# Patient Record
Sex: FEMALE | HISPANIC OR LATINO | Employment: FULL TIME | URBAN - METROPOLITAN AREA
[De-identification: names, ages, dates, MRNs, and addresses within clinical notes are randomized per-mention and may not be internally consistent; named-entity substitution may affect disease eponyms.]

---

## 2021-04-01 ENCOUNTER — OFFICE VISIT (OUTPATIENT)
Dept: OBSTETRICS AND GYNECOLOGY | Facility: CLINIC | Age: 41
End: 2021-04-01

## 2021-04-01 ENCOUNTER — LAB (OUTPATIENT)
Dept: LAB | Facility: HOSPITAL | Age: 41
End: 2021-04-01

## 2021-04-01 VITALS
DIASTOLIC BLOOD PRESSURE: 78 MMHG | BODY MASS INDEX: 34.27 KG/M2 | HEIGHT: 59 IN | WEIGHT: 170 LBS | SYSTOLIC BLOOD PRESSURE: 120 MMHG | TEMPERATURE: 96.9 F

## 2021-04-01 DIAGNOSIS — N93.9 ABNORMAL UTERINE BLEEDING (AUB): ICD-10-CM

## 2021-04-01 DIAGNOSIS — N60.11 FIBROCYSTIC BREAST CHANGES OF BOTH BREASTS: ICD-10-CM

## 2021-04-01 DIAGNOSIS — R10.30 LOWER ABDOMINAL PAIN: ICD-10-CM

## 2021-04-01 DIAGNOSIS — Z12.39 BREAST SCREENING: ICD-10-CM

## 2021-04-01 DIAGNOSIS — N60.12 FIBROCYSTIC BREAST CHANGES OF BOTH BREASTS: ICD-10-CM

## 2021-04-01 DIAGNOSIS — Z01.419 WOMEN'S ANNUAL ROUTINE GYNECOLOGICAL EXAMINATION: ICD-10-CM

## 2021-04-01 DIAGNOSIS — N85.2 UTERINE ENLARGEMENT: ICD-10-CM

## 2021-04-01 DIAGNOSIS — N93.9 ABNORMAL UTERINE BLEEDING (AUB): Primary | ICD-10-CM

## 2021-04-01 LAB
25(OH)D3 SERPL-MCNC: 25.1 NG/ML (ref 30–100)
ALBUMIN SERPL-MCNC: 4.3 G/DL (ref 3.5–5.2)
ALBUMIN/GLOB SERPL: 2 G/DL
ALP SERPL-CCNC: 55 U/L (ref 39–117)
ALT SERPL W P-5'-P-CCNC: 16 U/L (ref 1–33)
ANION GAP SERPL CALCULATED.3IONS-SCNC: 8.8 MMOL/L (ref 5–15)
AST SERPL-CCNC: 16 U/L (ref 1–32)
BILIRUB SERPL-MCNC: 0.3 MG/DL (ref 0–1.2)
BILIRUB UR QL STRIP: NEGATIVE
BUN SERPL-MCNC: 11 MG/DL (ref 6–20)
BUN/CREAT SERPL: 17.2 (ref 7–25)
CALCIUM SPEC-SCNC: 8.3 MG/DL (ref 8.6–10.5)
CHLORIDE SERPL-SCNC: 105 MMOL/L (ref 98–107)
CHOLEST SERPL-MCNC: 172 MG/DL (ref 0–200)
CLARITY UR: CLEAR
CO2 SERPL-SCNC: 25.2 MMOL/L (ref 22–29)
COLOR UR: YELLOW
CREAT SERPL-MCNC: 0.64 MG/DL (ref 0.57–1)
DEPRECATED RDW RBC AUTO: 45.5 FL (ref 37–54)
ERYTHROCYTE [DISTWIDTH] IN BLOOD BY AUTOMATED COUNT: 13.1 % (ref 12.3–15.4)
ESTRADIOL SERPL HS-MCNC: 127 PG/ML
FSH SERPL-ACNC: 2.56 MIU/ML
GFR SERPL CREATININE-BSD FRML MDRD: 102 ML/MIN/1.73
GFR SERPL CREATININE-BSD FRML MDRD: 124 ML/MIN/1.73
GLOBULIN UR ELPH-MCNC: 2.1 GM/DL
GLUCOSE SERPL-MCNC: 96 MG/DL (ref 65–99)
GLUCOSE UR STRIP-MCNC: NEGATIVE MG/DL
HBA1C MFR BLD: 5.79 % (ref 4.8–5.6)
HCG INTACT+B SERPL-ACNC: <0.5 MIU/ML
HCT VFR BLD AUTO: 44.9 % (ref 34–46.6)
HGB BLD-MCNC: 14.7 G/DL (ref 12–15.9)
HGB UR QL STRIP.AUTO: NEGATIVE
KETONES UR QL STRIP: NEGATIVE
LEUKOCYTE ESTERASE UR QL STRIP.AUTO: NEGATIVE
MCH RBC QN AUTO: 30.8 PG (ref 26.6–33)
MCHC RBC AUTO-ENTMCNC: 32.7 G/DL (ref 31.5–35.7)
MCV RBC AUTO: 94.1 FL (ref 79–97)
NITRITE UR QL STRIP: NEGATIVE
PH UR STRIP.AUTO: 6.5 [PH] (ref 5–8)
PLATELET # BLD AUTO: 233 10*3/MM3 (ref 140–450)
PMV BLD AUTO: 11 FL (ref 6–12)
POTASSIUM SERPL-SCNC: 4 MMOL/L (ref 3.5–5.2)
PROGEST SERPL-MCNC: 12.6 NG/ML
PROLACTIN SERPL-MCNC: 8.45 NG/ML (ref 4.79–23.3)
PROT SERPL-MCNC: 6.4 G/DL (ref 6–8.5)
PROT UR QL STRIP: NEGATIVE
RBC # BLD AUTO: 4.77 10*6/MM3 (ref 3.77–5.28)
SODIUM SERPL-SCNC: 139 MMOL/L (ref 136–145)
SP GR UR STRIP: 1.02 (ref 1–1.03)
TSH SERPL DL<=0.05 MIU/L-ACNC: 3.72 UIU/ML (ref 0.27–4.2)
UROBILINOGEN UR QL STRIP: NORMAL
WBC # BLD AUTO: 6.21 10*3/MM3 (ref 3.4–10.8)

## 2021-04-01 PROCEDURE — 83036 HEMOGLOBIN GLYCOSYLATED A1C: CPT | Performed by: OBSTETRICS & GYNECOLOGY

## 2021-04-01 PROCEDURE — 80053 COMPREHEN METABOLIC PANEL: CPT | Performed by: OBSTETRICS & GYNECOLOGY

## 2021-04-01 PROCEDURE — 84443 ASSAY THYROID STIM HORMONE: CPT | Performed by: OBSTETRICS & GYNECOLOGY

## 2021-04-01 PROCEDURE — 36415 COLL VENOUS BLD VENIPUNCTURE: CPT | Performed by: OBSTETRICS & GYNECOLOGY

## 2021-04-01 PROCEDURE — 84144 ASSAY OF PROGESTERONE: CPT | Performed by: OBSTETRICS & GYNECOLOGY

## 2021-04-01 PROCEDURE — 85027 COMPLETE CBC AUTOMATED: CPT | Performed by: OBSTETRICS & GYNECOLOGY

## 2021-04-01 PROCEDURE — 84146 ASSAY OF PROLACTIN: CPT | Performed by: OBSTETRICS & GYNECOLOGY

## 2021-04-01 PROCEDURE — 83001 ASSAY OF GONADOTROPIN (FSH): CPT | Performed by: OBSTETRICS & GYNECOLOGY

## 2021-04-01 PROCEDURE — 82670 ASSAY OF TOTAL ESTRADIOL: CPT | Performed by: OBSTETRICS & GYNECOLOGY

## 2021-04-01 PROCEDURE — 58100 BIOPSY OF UTERUS LINING: CPT | Performed by: OBSTETRICS & GYNECOLOGY

## 2021-04-01 PROCEDURE — 81003 URINALYSIS AUTO W/O SCOPE: CPT

## 2021-04-01 PROCEDURE — 99215 OFFICE O/P EST HI 40 MIN: CPT | Performed by: OBSTETRICS & GYNECOLOGY

## 2021-04-01 PROCEDURE — 84702 CHORIONIC GONADOTROPIN TEST: CPT | Performed by: OBSTETRICS & GYNECOLOGY

## 2021-04-01 PROCEDURE — 82465 ASSAY BLD/SERUM CHOLESTEROL: CPT | Performed by: OBSTETRICS & GYNECOLOGY

## 2021-04-01 PROCEDURE — 82306 VITAMIN D 25 HYDROXY: CPT | Performed by: OBSTETRICS & GYNECOLOGY

## 2021-04-01 PROCEDURE — 99386 PREV VISIT NEW AGE 40-64: CPT | Performed by: OBSTETRICS & GYNECOLOGY

## 2021-04-01 RX ORDER — IBUPROFEN 600 MG/1
600 TABLET ORAL EVERY 6 HOURS PRN
COMMUNITY

## 2021-04-01 NOTE — PROGRESS NOTES
Subjective     Chief Complaint   Patient presents with   • Gynecologic Exam     new patient patient having issue with having dark blood coming out after her menses after wiping all throughout her cycle until next menses occur. Also notices bloody show after intercourse.  Patient states she also feels something moving around in ovary area.   Menses regular each.  Patient also states she needs pap smear       Ban Goodman is a 41 y.o. year old  presenting to be seen for her annual exam but presents a new problem for evaluation and management as well.      History of Present Illness  Location:  Vaginal bleeding diffuse lower abdominal pain  Signs/Symptoms:  Bleeding monthly, 3 days, unchanged., but has back-red spotting for another  two weeks. Some lower abdominal pain almost daily  Duration:  About on  year  Severity:  Bleeding is scant. Abdominal pain is mild to moderate waxes and wanes  Timing:  Daily pain. Bleeding as above  Modifying Factors:  She is unaware of any modifying factors  The patient had a tubal ligation years ago.  [place] is not painful for her.  She is currently sexually active.  She has normal bowel function.  She has intermittent urinary complaints of urgency.    Past Medical History:   Diagnosis Date   • Ovarian cyst    • Urinary tract infection       Family History   Problem Relation Age of Onset   • Hypertension Mother    • Kidney disease Mother       Social History     Socioeconomic History   • Marital status: Unknown     Spouse name: Not on file   • Number of children: Not on file   • Years of education: Not on file   • Highest education level: Not on file   Tobacco Use   • Smoking status: Never Smoker   Substance and Sexual Activity   • Alcohol use: Never   • Sexual activity: Never        Her LMP was Patient's last menstrual period was 2021 (exact date)..  Her cycles are regular, predictable and consistent every 28 - 32 days.  Usually her flow is typically normal with no  more than 0 days of heavy flow each menses.   Additionally she describes Continuous dark spotting as noted above associated with her menses.    She is sexually active.  In the past 12 months there have not been new sexual partners.  Condoms are not typically used.  She would not like to be screened for STD's at today's exam.     Current contraceptive methods being used: tubal ligation.  In the coming year, she is not considering changing her current contraceptive method.    She exercises regularly: no.  She wears her seat belt: yes.  She has concerns about domestic violence: no.  Health Maintenance, Female  Adopting a healthy lifestyle and getting preventive care are important in promoting health and wellness. Ask your health care provider about:  · The right schedule for you to have regular tests and exams.  · Things you can do on your own to prevent diseases and keep yourself healthy.  What should I know about diet, weight, and exercise?  Eat a healthy diet       · Eat a diet that includes plenty of vegetables, fruits, low-fat dairy products, and lean protein.  · Do not eat a lot of foods that are high in solid fats, added sugars, or sodium.  Maintain a healthy weight  Body mass index (BMI) is used to identify weight problems. It estimates body fat based on height and weight. Your health care provider can help determine your BMI and help you achieve or maintain a healthy weight.  Get regular exercise  Get regular exercise. This is one of the most important things you can do for your health. Most adults should:  · Exercise for at least 150 minutes each week. The exercise should increase your heart rate and make you sweat (moderate-intensity exercise).  · Do strengthening exercises at least twice a week. This is in addition to the moderate-intensity exercise.  · Spend less time sitting. Even light physical activity can be beneficial.  Watch cholesterol and blood lipids  Have your blood tested for lipids and  cholesterol at 20 years of age, then have this test every 5 years.  Have your cholesterol levels checked more often if:  · Your lipid or cholesterol levels are high.  · You are older than 40 years of age.  · You are at high risk for heart disease.  What should I know about cancer screening?  Depending on your health history and family history, you may need to have cancer screening at various ages. This may include screening for:  · Breast cancer.  · Cervical cancer.  · Colorectal cancer.  · Skin cancer.  · Lung cancer.  What should I know about heart disease, diabetes, and high blood pressure?  Blood pressure and heart disease  · High blood pressure causes heart disease and increases the risk of stroke. This is more likely to develop in people who have high blood pressure readings, are of  descent, or are overweight.  · Have your blood pressure checked:  ? Every 3-5 years if you are 18-39 years of age.  ? Every year if you are 40 years old or older.  Diabetes  Have regular diabetes screenings. This checks your fasting blood sugar level. Have the screening done:  · Once every three years after age 40 if you are at a normal weight and have a low risk for diabetes.  · More often and at a younger age if you are overweight or have a high risk for diabetes.  What should I know about preventing infection?  Hepatitis B  If you have a higher risk for hepatitis B, you should be screened for this virus. Talk with your health care provider to find out if you are at risk for hepatitis B infection.  Hepatitis C  Testing is recommended for:  · Everyone born from 1945 through 1965.  · Anyone with known risk factors for hepatitis C.  Sexually transmitted infections (STIs)  · Get screened for STIs, including gonorrhea and chlamydia, if:  ? You are sexually active and are younger than 24 years of age.  ? You are older than 24 years of age and your health care provider tells you that you are at risk for this type of  infection.  ? Your sexual activity has changed since you were last screened, and you are at increased risk for chlamydia or gonorrhea. Ask your health care provider if you are at risk.  · Ask your health care provider about whether you are at high risk for HIV. Your health care provider may recommend a prescription medicine to help prevent HIV infection. If you choose to take medicine to prevent HIV, you should first get tested for HIV. You should then be tested every 3 months for as long as you are taking the medicine.  Pregnancy  · If you are about to stop having your period (premenopausal) and you may become pregnant, seek counseling before you get pregnant.  · Take 400 to 800 micrograms (mcg) of folic acid every day if you become pregnant.  · Ask for birth control (contraception) if you want to prevent pregnancy.  Osteoporosis and menopause  Osteoporosis is a disease in which the bones lose minerals and strength with aging. This can result in bone fractures. If you are 65 years old or older, or if you are at risk for osteoporosis and fractures, ask your health care provider if you should:  · Be screened for bone loss.  · Take a calcium or vitamin D supplement to lower your risk of fractures.  · Be given hormone replacement therapy (HRT) to treat symptoms of menopause.  Follow these instructions at home:  Lifestyle  · Do not use any products that contain nicotine or tobacco, such as cigarettes, e-cigarettes, and chewing tobacco. If you need help quitting, ask your health care provider.  · Do not use street drugs.  · Do not share needles.  · Ask your health care provider for help if you need support or information about quitting drugs.  Alcohol use  · Do not drink alcohol if:  ? Your health care provider tells you not to drink.  ? You are pregnant, may be pregnant, or are planning to become pregnant.  · If you drink alcohol:  ? Limit how much you use to 0-1 drink a day.  ? Limit intake if you are  breastfeeding.  · Be aware of how much alcohol is in your drink. In the [place] one drink equals one 12 oz bottle of beer (355 mL), one 5 oz glass of wine (148 mL), or one 1½ oz glass of hard liquor (44 mL).  General instructions  · Schedule regular health, dental, and eye exams.  · Stay current with your vaccines.  · Tell your health care provider if:  ? You often feel depressed.  ? You have ever been abused or do not feel safe at home.  Summary  · Adopting a healthy lifestyle and getting preventive care are important in promoting health and wellness.  · Follow your health care provider's instructions about healthy diet, exercising, and getting tested or screened for diseases.  · Follow your health care provider's instructions on monitoring your cholesterol and blood pressure.  This information is not intended to replace advice given to you by your health care provider. Make sure you discuss any questions you have with your health care provider    GYN screening history:  · Last pap: she reports her last PAP was normal  · Last mammogram: she has never had a mammogram  · Last fasting lipid profile: she has never had a lipid panel done  · Last 25-hydroxy vitamin D level: she has never had her Vitamin D level checked  · Last colonoscopy: she has never had a colonoscopy done  · Last DEXA: she has never had a DEXA.    Additional Complaints:  None    The following portions of the patient's history were reviewed and updated as appropriate:vital signs, allergies, current medications, past medical history, past social history, past surgical history and problem list.        Review of Systems   Constitutional: Positive for weight gain. Negative for chills, decreased appetite, diaphoresis, fever, malaise/fatigue and night sweats.   HENT: Negative.    Eyes: Negative.    Cardiovascular: Negative.    Respiratory: Negative.    Endocrine: Positive for heat intolerance. Negative for cold intolerance, polydipsia, polyphagia and  "polyuria.   Skin: Negative.    Musculoskeletal: Negative.    Gastrointestinal: Negative.    Genitourinary: Positive for dysuria, frequency, hesitancy, menorrhagia, non-menstrual bleeding, pelvic pain and urgency. Negative for bladder incontinence, decreased libido, flank pain, genital sores, hematuria, incomplete emptying, missed menses and nocturia.   Neurological: Negative.    Psychiatric/Behavioral: Negative.        Objective   /78   Temp 96.9 °F (36.1 °C)   Ht 150 cm (59.06\")   Wt 77.1 kg (170 lb)   LMP 03/25/2021 (Exact Date)   Breastfeeding No   BMI 34.27 kg/m²   Physical Exam  General:  well developed; well nourished  no acute distress   Constitutional: obese and healthy   Skin:  No suspicious lesions seen   Thyroid: normal to inspection and palpation   Lungs:  breathing is unlabored  clear to auscultation bilaterally   Heart:  regular rate and rhythm, S1, S2 normal, no murmur, click, rub or gallop   Breasts:  Examined in supine position  Symmetric without masses or skin dimpling  Nipples normal without inversion, lesions or discharge  There are no palpable axillary nodes  diffuse fibrocycti changes    Abdomen: soft, non-tender; no masses  no umbilical or inginual hernias are present  no hepato-splenomegaly   Pelvis: Clinical staff was present for exam  External genitalia:  normal appearance of the external genitalia including Bartholin's and [place]'s glands.  :  urethral meatus normal; urethral hypermobility is absent.  Vaginal:  normal pink mucosa without prolapse or lesions. blood present -  dark red and scant amount;  Cervix:  normal appearance pap done  Uterus:  asymmetrically enlarged, consistent with 10-12 weeks size;  Adnexa:  normal bimanual exam of the adnexa.   Musculoskeletal: negative   Neuro: normal without focal findings, mental status, speech normal, alert and oriented x3, NYASIA and reflexes normal and symmetric   Psych: oriented to time, place and person, mood and affect are " within normal limits, pt is a good historian; no memory problems were noted       Lab Review   No data reviewed    Imaging  No data reviewed    Assessment/Plan   Diagnosis:  1. Abnormal uterine bleeding (AUB)   We reviewed the distinction between hormonally based dysfunctional uterine bleeding and abnormal uterine bleeding as result of an anatomic or neoplastic process.  We discussed the evaluation of abnormal bleeding in her age group.  We discussed the potential treatment modalities both medical hormonal and surgical based on the results of the evaluation.  Her uterus as mentioned below is moderately enlarged and likely due to uterine leiomyomata.  She has not had a gynecologic exam in over 2 years and has never been made aware of her uterus being enlarged before  Certainly evaluation of the endometrium with endometrial biopsy is indicated in this context and will be performed as documented below  Lab work will be used in order to assess the hormonal milieu.  She can return in approximately 2 weeks with an ultrasound and along with the laboratory and pathologic data a management plan will be presented which will include either observation, medical, hormonal, or surgical treatment.   2. Lower abdominal pain    3. Women's annual routine gynecological examination    4. Breast screening    5. Fibrocystic breast changes of both breasts   We discussed the benignity of fibrocystic changes in the breast and I made lifestyle suggestions to manage these as well as the use of nonsteroidal anti-inflammatory agents.  We discussed the use of vitamin D supplementation as well as a supportive bra and the avoidance of exam pains in her diet   6. Uterine enlargement        Orders Placed This Encounter   Procedures   • Endometrial Biopsy   • US Non-ob Transvaginal     Standing Status:   Future     Standing Expiration Date:   4/1/2022     Order Specific Question:   Reason for Exam:     Answer:   AUB   • Mammo Screening Digital  Tomosynthesis Bilateral With CAD     Standing Status:   Future     Standing Expiration Date:   4/1/2022     Order Specific Question:   Reason for Exam:     Answer:   screen     Order Specific Question:   Patient Pregnant     Answer:   No   • CBC (No Diff)   • Cholesterol, Total   • Comprehensive Metabolic Panel   • Estradiol   • Follicle Stimulating Hormone   • hCG, Quantitative, Pregnancy   • Hemoglobin A1c   • Progesterone   • Prolactin   • TSH   • Vitamin D 25 Hydroxy   • Urinalysis With Culture If Indicated -     Standing Status:   Future     Number of Occurrences:   1     Standing Expiration Date:   4/1/2022     No orders of the defined types were placed in this encounter.  Endometrial Biopsy Procedure Note    Pre-operative Diagnosis: AUB    Post-operative Diagnosis: same    Indications: abnormal uterine bleeding, enlarged uterus    Procedure Details    Urine pregnancy test was not done.  The risks (including infection, bleeding, pain, and uterine perforation) and benefits of the procedure were explained to the patient and Written informed consent was obtained.  Antibiotic prophylaxis against endocarditis was not indicated.     The patient was placed in the dorsal lithotomy position.  Bimanual exam showed the uterus to be in the neutral position.  A Graves' speculum inserted in the vagina, and the cervix prepped with povidone iodine.  Endocervical curettage with a Kevorkian curette was not performed.     A sharp tenaculum was applied to the anterior lip of the cervix for stabilization.  A sterile uterine sound was used to sound the uterus to a depth of 10cm.  A Pipelle endometrial aspirator was used to sample the endometrium.  Sample was sent for pathologic examination.    Condition:  Stable    Complications:  None    Patient tolerated the procedure well without complications.  The patient was advised to call for any fever or for prolonged or severe pain or bleeding. She was advised to use NSAID as needed for  mild to moderate pain. She was advised to avoid vaginal intercourse for 48 hours or until the bleeding has completely stopped.      Follow up: 2 week(s)         This note was electronically signed.    Sumit Lozada MD  April 1, 2021  In addition to the routine gynecologic exam done for annual screening, and additional 45 minutes of direct face-to-face management was required in a separate evaluation and management session to address the concerns of longstanding abdominal pain and abnormal uterine bleeding.  There is a language barrier to communication and an  was present for the entire visit.

## 2021-04-01 NOTE — PATIENT INSTRUCTIONS
Health Maintenance, Female  Adopting a healthy lifestyle and getting preventive care are important in promoting health and wellness. Ask your health care provider about:  · The right schedule for you to have regular tests and exams.  · Things you can do on your own to prevent diseases and keep yourself healthy.  What should I know about diet, weight, and exercise?  Eat a healthy diet    · Eat a diet that includes plenty of vegetables, fruits, low-fat dairy products, and lean protein.  · Do not eat a lot of foods that are high in solid fats, added sugars, or sodium.  Maintain a healthy weight  Body mass index (BMI) is used to identify weight problems. It estimates body fat based on height and weight. Your health care provider can help determine your BMI and help you achieve or maintain a healthy weight.  Get regular exercise  Get regular exercise. This is one of the most important things you can do for your health. Most adults should:  · Exercise for at least 150 minutes each week. The exercise should increase your heart rate and make you sweat (moderate-intensity exercise).  · Do strengthening exercises at least twice a week. This is in addition to the moderate-intensity exercise.  · Spend less time sitting. Even light physical activity can be beneficial.  Watch cholesterol and blood lipids  Have your blood tested for lipids and cholesterol at 20 years of age, then have this test every 5 years.  Have your cholesterol levels checked more often if:  · Your lipid or cholesterol levels are high.  · You are older than 40 years of age.  · You are at high risk for heart disease.  What should I know about cancer screening?  Depending on your health history and family history, you may need to have cancer screening at various ages. This may include screening for:  · Breast cancer.  · Cervical cancer.  · Colorectal cancer.  · Skin cancer.  · Lung cancer.  What should I know about heart disease, diabetes, and high blood  pressure?  Blood pressure and heart disease  · High blood pressure causes heart disease and increases the risk of stroke. This is more likely to develop in people who have high blood pressure readings, are of  descent, or are overweight.  · Have your blood pressure checked:  ? Every 3-5 years if you are 18-39 years of age.  ? Every year if you are 40 years old or older.  Diabetes  Have regular diabetes screenings. This checks your fasting blood sugar level. Have the screening done:  · Once every three years after age 40 if you are at a normal weight and have a low risk for diabetes.  · More often and at a younger age if you are overweight or have a high risk for diabetes.  What should I know about preventing infection?  Hepatitis B  If you have a higher risk for hepatitis B, you should be screened for this virus. Talk with your health care provider to find out if you are at risk for hepatitis B infection.  Hepatitis C  Testing is recommended for:  · Everyone born from 1945 through 1965.  · Anyone with known risk factors for hepatitis C.  Sexually transmitted infections (STIs)  · Get screened for STIs, including gonorrhea and chlamydia, if:  ? You are sexually active and are younger than 24 years of age.  ? You are older than 24 years of age and your health care provider tells you that you are at risk for this type of infection.  ? Your sexual activity has changed since you were last screened, and you are at increased risk for chlamydia or gonorrhea. Ask your health care provider if you are at risk.  · Ask your health care provider about whether you are at high risk for HIV. Your health care provider may recommend a prescription medicine to help prevent HIV infection. If you choose to take medicine to prevent HIV, you should first get tested for HIV. You should then be tested every 3 months for as long as you are taking the medicine.  Pregnancy  · If you are about to stop having your period (premenopausal) and  you may become pregnant, seek counseling before you get pregnant.  · Take 400 to 800 micrograms (mcg) of folic acid every day if you become pregnant.  · Ask for birth control (contraception) if you want to prevent pregnancy.  Osteoporosis and menopause  Osteoporosis is a disease in which the bones lose minerals and strength with aging. This can result in bone fractures. If you are 65 years old or older, or if you are at risk for osteoporosis and fractures, ask your health care provider if you should:  · Be screened for bone loss.  · Take a calcium or vitamin D supplement to lower your risk of fractures.  · Be given hormone replacement therapy (HRT) to treat symptoms of menopause.  Follow these instructions at home:  Lifestyle  · Do not use any products that contain nicotine or tobacco, such as cigarettes, e-cigarettes, and chewing tobacco. If you need help quitting, ask your health care provider.  · Do not use street drugs.  · Do not share needles.  · Ask your health care provider for help if you need support or information about quitting drugs.  Alcohol use  · Do not drink alcohol if:  ? Your health care provider tells you not to drink.  ? You are pregnant, may be pregnant, or are planning to become pregnant.  · If you drink alcohol:  ? Limit how much you use to 0-1 drink a day.  ? Limit intake if you are breastfeeding.  · Be aware of how much alcohol is in your drink. In the [place] one drink equals one 12 oz bottle of beer (355 mL), one 5 oz glass of wine (148 mL), or one 1½ oz glass of hard liquor (44 mL).  General instructions  · Schedule regular health, dental, and eye exams.  · Stay current with your vaccines.  · Tell your health care provider if:  ? You often feel depressed.  ? You have ever been abused or do not feel safe at home.  Summary  · Adopting a healthy lifestyle and getting preventive care are important in promoting health and wellness.  · Follow your health care provider's instructions about healthy  diet, exercising, and getting tested or screened for diseases.  · Follow your health care provider's instructions on monitoring your cholesterol and blood pressure.  This information is not intended to replace advice given to you by your health care provider. Make sure you discuss any questions you have with your health care provider.  Document Revised: 12/11/2019 Document Reviewed: 12/11/2019  [employer] Patient Education © 2021 [employer]    Health Maintenance, Female  Adopting a healthy lifestyle and getting preventive care are important in promoting health and wellness. Ask your health care provider about:  · The right schedule for you to have regular tests and exams.  · Things you can do on your own to prevent diseases and keep yourself healthy.  What should I know about diet, weight, and exercise?  Eat a healthy diet    · Eat a diet that includes plenty of vegetables, fruits, low-fat dairy products, and lean protein.  · Do not eat a lot of foods that are high in solid fats, added sugars, or sodium.  Maintain a healthy weight  Body mass index (BMI) is used to identify weight problems. It estimates body fat based on height and weight. Your health care provider can help determine your BMI and help you achieve or maintain a healthy weight.  Get regular exercise  Get regular exercise. This is one of the most important things you can do for your health. Most adults should:  · Exercise for at least 150 minutes each week. The exercise should increase your heart rate and make you sweat (moderate-intensity exercise).  · Do strengthening exercises at least twice a week. This is in addition to the moderate-intensity exercise.  · Spend less time sitting. Even light physical activity can be beneficial.  Watch cholesterol and blood lipids  Have your blood tested for lipids and cholesterol at 20 years of age, then have this test every 5 years.  Have your cholesterol levels checked more often if:  · Your lipid or cholesterol  levels are high.  · You are older than 40 years of age.  · You are at high risk for heart disease.  What should I know about cancer screening?  Depending on your health history and family history, you may need to have cancer screening at various ages. This may include screening for:  · Breast cancer.  · Cervical cancer.  · Colorectal cancer.  · Skin cancer.  · Lung cancer.  What should I know about heart disease, diabetes, and high blood pressure?  Blood pressure and heart disease  · High blood pressure causes heart disease and increases the risk of stroke. This is more likely to develop in people who have high blood pressure readings, are of  descent, or are overweight.  · Have your blood pressure checked:  ? Every 3-5 years if you are 18-39 years of age.  ? Every year if you are 40 years old or older.  Diabetes  Have regular diabetes screenings. This checks your fasting blood sugar level. Have the screening done:  · Once every three years after age 40 if you are at a normal weight and have a low risk for diabetes.  · More often and at a younger age if you are overweight or have a high risk for diabetes.  What should I know about preventing infection?  Hepatitis B  If you have a higher risk for hepatitis B, you should be screened for this virus. Talk with your health care provider to find out if you are at risk for hepatitis B infection.  Hepatitis C  Testing is recommended for:  · Everyone born from 1945 through 1965.  · Anyone with known risk factors for hepatitis C.  Sexually transmitted infections (STIs)  · Get screened for STIs, including gonorrhea and chlamydia, if:  ? You are sexually active and are younger than 24 years of age.  ? You are older than 24 years of age and your health care provider tells you that you are at risk for this type of infection.  ? Your sexual activity has changed since you were last screened, and you are at increased risk for chlamydia or gonorrhea. Ask your health care  provider if you are at risk.  · Ask your health care provider about whether you are at high risk for HIV. Your health care provider may recommend a prescription medicine to help prevent HIV infection. If you choose to take medicine to prevent HIV, you should first get tested for HIV. You should then be tested every 3 months for as long as you are taking the medicine.  Pregnancy  · If you are about to stop having your period (premenopausal) and you may become pregnant, seek counseling before you get pregnant.  · Take 400 to 800 micrograms (mcg) of folic acid every day if you become pregnant.  · Ask for birth control (contraception) if you want to prevent pregnancy.  Osteoporosis and menopause  Osteoporosis is a disease in which the bones lose minerals and strength with aging. This can result in bone fractures. If you are 65 years old or older, or if you are at risk for osteoporosis and fractures, ask your health care provider if you should:  · Be screened for bone loss.  · Take a calcium or vitamin D supplement to lower your risk of fractures.  · Be given hormone replacement therapy (HRT) to treat symptoms of menopause.  Follow these instructions at home:  Lifestyle  · Do not use any products that contain nicotine or tobacco, such as cigarettes, e-cigarettes, and chewing tobacco. If you need help quitting, ask your health care provider.  · Do not use street drugs.  · Do not share needles.  · Ask your health care provider for help if you need support or information about quitting drugs.  Alcohol use  · Do not drink alcohol if:  ? Your health care provider tells you not to drink.  ? You are pregnant, may be pregnant, or are planning to become pregnant.  · If you drink alcohol:  ? Limit how much you use to 0-1 drink a day.  ? Limit intake if you are breastfeeding.  · Be aware of how much alcohol is in your drink. In the [place] one drink equals one 12 oz bottle of beer (355 mL), one 5 oz glass of wine (148 mL), or one 1½  oz glass of hard liquor (44 mL).  General instructions  · Schedule regular health, dental, and eye exams.  · Stay current with your vaccines.  · Tell your health care provider if:  ? You often feel depressed.  ? You have ever been abused or do not feel safe at home.  Summary  · Adopting a healthy lifestyle and getting preventive care are important in promoting health and wellness.  · Follow your health care provider's instructions about healthy diet, exercising, and getting tested or screened for diseases.  · Follow your health care provider's instructions on monitoring your cholesterol and blood pressure.  This information is not intended to replace advice given to you by your health care provider. Make sure you discuss any questions you have with your health care provider.  Document Revised: 12/11/2019 Document Reviewed: 12/11/2019  [employer] Patient Education © 2021 [employer]    Sangrado uterino anormal  Abnormal Uterine Bleeding    El sangrado uterino anormal sucede cuando tiene un sangrado anormal del útero. Puede tratarse de sangrado después de tener relaciones sexuales, o sangrado o manchas entre los períodos menstruales. También puede tratarse de un sangrado más abundante de lo normal, períodos menstruales que cobian más de lo normal o sangrado que ocurre después de la menopausia.  El sangrado uterino anormal puede afectar a las adolescentes, las mujeres en edad fértil, las embarazadas y las mujeres que vivar llegado a la menopausia. Las causas más comunes de sangrado uterino anormal incluyen lo siguiente:  · Embarazo.  · Crecimiento de tejido (pólipos).  · Tumores o crecimientos benignos en el útero (fibromas). Estos no son tumores cancerosos.  · Infección.  · Cáncer.  · Cantidad excesiva o insuficiente de algunas hormonas en el cuerpo (desequilibrios hormonales).  El médico debe evaluar cualquier clase de sangrado anormal. Muchos casos son leves y simples de tratar, mientras que otros pueden ser más  graves. El tratamiento depende de la causa y la intensidad del sangrado.  Siga estas instrucciones en baxter casa:  Medicamentos  · Use los medicamentos de venta otis y los recetados solamente alfonso se lo haya indicado el médico.  · Infórmele al médico acerca de los demás los medicamentos que katina. Es posible que le indiquen que deje de hesham aspirina o medicamentos que contengan aspirina. Estos medicamentos pueden agravar el sangrado.  · Si le recetaron comprimidos de ed, tómelos alfonso se lo haya indicado el médico. Los comprimidos de ed ayudan a reponer el ed que el organismo pierde a causa de esta afección.  Control del estreñimiento  En los casos de sangrado intenso, posiblemente le indiquen que aumente la ingesta de ed para tratar la anemia. [place] puede causarle estreñimiento. Para prevenir o tratar el estreñimiento, es posible que deba hacer lo siguiente:  · Beber suficiente líquido alfonso para mantener la orina de color amarillo pálido.  · Usar medicamentos recetados o de venta otis.  · Consumir alimentos ricos en fibra, alfonso frijoles, cereales integrales, y frutas y verduras frescas.  · Limitar el consumo de alimentos ricos en grasa y azúcares procesados, alfonso los alimentos fritos o dulces.  Instrucciones generales  · Controle baxter afección para gee si hay cambios.  · No use tampones ni duchas vaginales, ni tenga relaciones sexuales hasta que el médico la autorice.  · Cambie los apósitos con frecuencia.  · Hágase exámenes regulares. [place] incluye exámenes pélvicos y pruebas de detección de cáncer de gwen uterino.  ? Es baxter responsabilidad obtener los resultados de cualquier examen que se realice. Consulte al médico o pregunte en el departamento donde se realizan las pruebas cuándo estarán listos los resultados.  · Concurra a todas las visitas de seguimiento alfonso se lo haya indicado el médico. [place] es importante.  Comuníquese con un médico si:  · Tiene sangrado que dura más de 1 semana.  · Se siente  mareada por momentos.  · Siente náuseas o vomita.  · Se siente mareada o débil.  · Nota cualquier otro cambio que indica que baxter afección está empeorando.  Busque ayuda de inmediato si:  · Se desmaya.  · Tiene sangrado que empapa un apósito cada hora.  · Tiene dolor en el abdomen.  · Tiene fiebre o escalofríos.  · Comienza a sentirse débil o a sudar.  · Elimina coágulos de chanda grandes por la vagina.  Resumen  · El sangrado uterino anormal sucede cuando tiene un sangrado anormal del útero.  · El médico debe evaluar cualquier clase de sangrado anormal. Muchos casos son leves y simples de tratar, mientras que otros pueden ser más graves.  · El tratamiento dependerá de la causa del sangrado.  · Obtenga ayuda de inmediato si se desmaya, tiene sangrado que empapa un apósito cada hora o elimina coágulos de chanda grandes por la vagina.  Esta información no tiene alfonso fin reemplazar el consejo del médico. Asegúrese de hacerle al médico cualquier pregunta que tenga.  Document Revised: 01/14/2021 Document Reviewed: 01/14/2021  [employer] Patient Education © 2021 [employer]    Hemorragia uterina disfuncional  Dysfunctional Uterine Bleeding  Ashly hemorragia uterina disfuncional es ashly hemorragia anormal del útero. La hemorragia uterina disfuncional incluye:  · Un período menstrual que se presenta antes o después de lo habitual.  · Un período menstrual más escaso o más abundante de lo habitual, o con grandes coágulos de chanda.  · Hemorragia vaginal entre períodos menstruales.  · Ausencia de isabel o más períodos menstruales.  · Hemorragia vaginal después de mantener relaciones sexuales.  · Hemorragia vaginal después de la menopausia.  Siga estas indicaciones en baxter casa:  Comida y bebida    · Siga ashly dieta balanceada. Incluya alimentos ricos en ed, alfonso hígado, carne de uli, mariscos, vegetales de hoja amy y huevos.  · A fin de prevenir o tratar el estreñimiento, el médico puede recomendarle que:  ? [place] suficiente  líquido alfonso para mantener la orina de color amarillo pálido.  ? Ayaz medicamentos recetados o de venta otis.  ? Consumir alimentos ricos en fibra, alfonso frijoles, cereales integrales, y frutas y verduras frescas.  ? Limitar baxter consumo de alimentos ricos en grasa y azúcares procesados, alfonso los alimentos fritos o dulces.  Medicamentos  · [place] los medicamentos de venta otis y los recetados solamente alfonso se lo haya indicado el médico.  · No cambie los medicamentos sin consultar con el médico.  · La aspirina o los medicamentos que contienen aspirina pueden hacer que la hemorragia empeore. No tome esos medicamentos:  ? Shira la semana anterior al período menstrual.  ? Shira el período menstrual.  · Si le recetaron comprimidos de ed, tómelos alfonso se lo haya indicado el médico. Los comprimidos de ed ayudan a reponer el ed que el organismo pierde a causa de esta afección.  Actividad  · Si debe cambiar el apósito o el tampón más de ashly vez cada 2 horas:  ? Acuéstese en la cama con los pies levantados (elevados).  ? Coloque ashly compresa fría en la mai inferior del abdomen.  ? Descanse todo lo que pueda hasta que la hemorragia se detenga o no sea tan intensa.  · No trate de perder peso hasta que la hemorragia se haya detenido y los niveles de ed en la chanda vuelvan a la normalidad.  Indicaciones generales    · Shira dos meses, anote:  ? Cuándo comienza baxter período menstrual.  ? Cuándo finaliza baxter período menstrual.  ? Cuándo se produce alguna hemorragia vaginal anormal.  ? Qué problemas observa.  · Concurra a todas las visitas de control alfonso se lo haya indicado el médico. [place] es importante.  Comuníquese con un médico si:  · Se siente mareada o débil.  · Tiene náuseas y vómitos.  · No puede comer ni beber sin vomitar.  · Se siente mareada o tiene diarrea mientras está tomando medicamentos.  · Está tomando hormonas o píldoras anticonceptivas, y desea cambiarlas o dejar de tomarlas.  Solicite ayuda  inmediatamente si:  · Tiene escalofríos o fiebre.  · Debe cambiar el apósito o el tampón más de tamie vez por hora.  · La hemorragia vaginal se vuelve más abundante, o el flujo contiene coágulos con más frecuencia.  · Siente dolor en el abdomen.  · Pierde el conocimiento.  · Presenta tamie erupción cutánea.  Resumen  · Tamie hemorragia uterina disfuncional es tamie hemorragia anormal del útero.  · Incluye el sangrado menstrual de duración, volumen o regularidad anormales.  · Las hemorragias luego de tener relaciones sexuales y después de la menopausia también se consideran tamie hemorragia uterina disfuncional.  Esta información no tiene alfonso fin reemplazar el consejo del médico. Asegúrese de hacerle al médico cualquier pregunta que tenga.  Document Revised: 07/29/2019 Document Reviewed: 07/29/2019  Elsevier Patient Education © 2021 [employer] Inc[employer]    Cambios fibroquísticos en las mamas  Fibrocystic Breast Changes    Los cambios fibroquísticos en las mamas son cambios en el tejido mamario que pueden provocar hinchazón, bultos o dolor en las mamas. [place] puede ocurrir debido a la acumulación de tejido cicatricial (tejido fibroso) o la formación de sacos de líquido (quistes) en las mamas. Los cambios fibroquísticos en las mamas pueden afectar tamie o ambas mamas. Esta afección es frecuente y no es cáncer.  ¿Cuáles son las causas?  Se desconoce la causa exacta de los cambios fibroquísticos en las mamas. Sin embargo, esta afección puede:  · Estar relacionada con las hormonas femeninas estrógeno y progesterona.  · Estar influenciada por rasgos familiares que se transmiten de madres a hijas (hereditarios).  ¿Cuáles son los signos o síntomas?  Los síntomas de esta afección incluyen:  · Dolor a la palpación, hinchazón, molestias leves o dolor.  · Tejido alargado que puede sentirse al tocar la mama.  · Bultos en tamie o ambas mamas.  · Cambios en el tamaño de las mamas. Las mamas pueden volverse más grandes antes del período menstrual y  volverse más pequeñas después de kesha.  · Secreción por el pezón.  Los síntomas de esta afección pueden afectar ashly o ambas mamas y, por lo general, empeoran antes de que comience el período menstrual. Los síntomas generalmente mejoran hacia el final de los períodos menstruales.  ¿Cómo se diagnostica?  Esta afección se diagnostica en función de la historia clínica y de un examen físico de las mamas. También pueden hacerle estudios, por ejemplo:  · Un estudio radiográfico de las mamas (mamografía).  · Ecografía.  · Resonancia magnética (RM).  · Extracción de ashly pequeña muestra de tejido de la mama para analizarla (biopsia de mama). [place] puede realizarse si baxter médico keo que algo más puede estar provocando los cambios en las mamas.  ¿Cómo se trata?  A menudo, no se requiere tratamiento para esta afección. Sin embargo, en algunos casos, puede no ser necesario el tratamiento, sino lo siguiente:  · Ayaz analgésicos de venta otis para ayudar a aliviar el dolor o malestar.  · Evitar o limitar la cafeína. Los alimentos y las bebidas que contienen cafeína incluyen el chocolate, las gaseosas, el café y el té.  · Reducir el consumo de azúcar y grasas.  El tratamiento también puede incluir lo siguiente:  · Un procedimiento para extraer el líquido de un quiste que le cause dolor (aspiración con aguja shawn).  · Ashly cirugía para extirpar un quiste que es dell, es sensible al tacto o no desaparece.  · Medicamentos que pueden disminuir la cantidad de hormonas femeninas.  Siga estas instrucciones en baxter casa:  Autocuidado  · Contrólese las mamas después de cada período menstrual. Contrólese las mamas el primer día de cada mes, si no tiene baxter período menstrual todos los meses. Busque cambios en las mamas, tales alfonso:  ? Mayor dolor a la palpación.  ? Un nuevo crecimiento.  ? Cambios en el tamaño.  ? Un cambio en un bulto existente.  Instrucciones generales  · Use los medicamentos de venta otis y los recetados solamente alfonso se  lo haya indicado el médico.  · Use un sostén de soporte o un sostén deportivo que le ajuste beti, especialmente al hacer ejercicio.  · Si se lo indica el médico, disminuya o evite la cafeína, las grasas y el azúcar en baxter dieta.  · Concurra a todas las visitas de seguimiento alfonso se lo haya indicado el médico. [place] es importante.  Comuníquese con un médico si:  · Observa líquido proveniente de los pezones, especialmente si es sanguinolento.  · Tiene nuevos bultos o nódulos en la mama.  · La mama se agranda, está enrojecida y le duele.  · Observa zonas en las mamas que se hunden.  · El pezón se aplana o se hunde.  Solicite ayuda de inmediato si:  · Presenta un enrojecimiento en el pecho que se propaga.  Resumen  · Los cambios fibroquísticos en las mamas son cambios en el tejido mamario que pueden provocar hinchazón, bultos o dolor en las mamas.  · Esta afección puede estar relacionada con las hormonas femeninas, estrógeno y progesterona.  · Si tiene esta afección, es importante que examine ebony mamas después de cada período menstrual. Contrólese las mamas el primer día de cada mes, si no tiene baxter período menstrual todos los meses.  Esta información no tiene alfonso fin reemplazar el consejo del médico. Asegúrese de hacerle al médico cualquier pregunta que tenga.  Document Revised: 01/25/2021 Document Reviewed: 01/25/2021  Elsevier Patient Education © 2021 [employer]

## 2021-04-02 DIAGNOSIS — Z01.419 WOMEN'S ANNUAL ROUTINE GYNECOLOGICAL EXAMINATION: ICD-10-CM

## 2021-04-26 ENCOUNTER — OFFICE VISIT (OUTPATIENT)
Dept: OBSTETRICS AND GYNECOLOGY | Facility: CLINIC | Age: 41
End: 2021-04-26

## 2021-04-26 VITALS
SYSTOLIC BLOOD PRESSURE: 122 MMHG | WEIGHT: 170 LBS | DIASTOLIC BLOOD PRESSURE: 70 MMHG | HEIGHT: 59 IN | BODY MASS INDEX: 34.27 KG/M2

## 2021-04-26 DIAGNOSIS — N85.2 UTERINE ENLARGEMENT: Primary | ICD-10-CM

## 2021-04-26 DIAGNOSIS — N93.9 ABNORMAL UTERINE BLEEDING (AUB): ICD-10-CM

## 2021-04-26 PROCEDURE — 99214 OFFICE O/P EST MOD 30 MIN: CPT | Performed by: OBSTETRICS & GYNECOLOGY

## 2021-04-26 RX ORDER — MEDROXYPROGESTERONE ACETATE 10 MG/1
10 TABLET ORAL DAILY
Qty: 10 TABLET | Refills: 0 | Status: SHIPPED | OUTPATIENT
Start: 2021-04-26 | End: 2021-05-06

## 2021-04-26 NOTE — PROGRESS NOTES
Chief complaint:  Follow-up abnormal uterine bleeding  History of present illness:  This patient was seen recently for the initial evaluation of abnormal uterine bleeding and some degree of chronic pain.  She underwent an exam which was consistent with small fibroids.  She had lab work done which was reviewed and is essentially normal.  Her vitamin D was somewhat deficient.  We discussed this as well as the use of a supplement.  Her hematocrit was normal.  She had an endometrial biopsy which was benign.  An ultrasound today was performed which revealed a normal endometrial cavity with the suggestion of a 2 mm fibroid of the posterior wall.  There is no obvious evidence of a submucosal component.  The patient presents with a .  Review of systems:  14 point reviewed otherwise negative  Vital signs:  Reviewed  Physical exam:  Not performed  Impression:  Abnormal uterine bleeding.  We again reviewed the etiology of abnormal bleeding in her age group.  We reviewed the distinction between hormonally based dysfunctional uterine bleeding and that abnormal uterine bleeding attributed to other conditions such as fibroids.  I think we have safely excluded an endometrial hyperplasia or endometrial malignancy.  I discussed with the patient that I thought the most reasonable approach would be to try management as if her bleeding were dysfunctional in nature with a cyclic progestin.  Since she is having regular menstrual cycles with prolonged bleeding I have suggested that she take Provera 10 mg days 16 through 25 of each menstrual cycle in hopes of maintaining the regularity but controlling the duration and flow of her menstrual periods.  She voiced understanding of this and the schedule of this medication was reviewed with and through the .  I have provided written instructions as well.  Meanwhile there is the situation with the newly diagnosed fibroids which may in fact be responsible for her menorrhagia.   I have discussed with her the nature of fibroids and the anticipated slow progression of fibroids.  I have recommended that we see her in follow-up in 3 months for both evaluation of her response to the cyclic progestin as well as repeating her ultrasound to confirm that the fibroids are not demonstrating rapid growth which would need a different approach clinically.  I discussed with the patient that should her bleeding not respond to hormonal manipulation the next step would be a surgical procedure with may be hysteroscopic or she may elect to undergo a hysterectomy depending on how her quality of life continues to be effective.  This was a 35-minute patient encounter with 30 minutes face-to-face devoted to the evaluation and management of abnormal uterine bleeding and uterine enlargement and a 41-year-old patient with a language barrier to communication